# Patient Record
Sex: FEMALE | Race: BLACK OR AFRICAN AMERICAN | NOT HISPANIC OR LATINO | Employment: UNEMPLOYED | URBAN - METROPOLITAN AREA
[De-identification: names, ages, dates, MRNs, and addresses within clinical notes are randomized per-mention and may not be internally consistent; named-entity substitution may affect disease eponyms.]

---

## 2022-03-07 ENCOUNTER — TRANSFERRED RECORDS (OUTPATIENT)
Dept: HEALTH INFORMATION MANAGEMENT | Facility: CLINIC | Age: 67
End: 2022-03-07
Payer: MEDICAID

## 2022-03-07 LAB — HBA1C MFR BLD: 6.5 % (ref 4.8–5.6)

## 2022-03-18 RX ORDER — ASPIRIN 81 MG/1
81 TABLET ORAL DAILY
COMMUNITY

## 2022-03-18 RX ORDER — AMLODIPINE BESYLATE 5 MG/1
5 TABLET ORAL DAILY
COMMUNITY

## 2022-03-18 RX ORDER — ATORVASTATIN CALCIUM 80 MG/1
80 TABLET, FILM COATED ORAL DAILY
COMMUNITY

## 2022-03-18 RX ORDER — PANTOPRAZOLE SODIUM 20 MG/1
20 TABLET, DELAYED RELEASE ORAL DAILY
COMMUNITY

## 2022-03-18 NOTE — PATIENT INSTRUCTIONS
Recommendations from today's MTM visit:                                                       1. Check blood sugars a couple times weekly, fasting in the morning (goal less than 130 mg/dL).    2. Start metformin 1 tablet (500 mg) once daily with food.    3. Work on decreasing sugars and increasing meat/vegetables.    Follow-up: Return in about 3 months (around 6/22/2022) for Primary Care Provider Visit.    It was great to speak with you today.  I value your experience and would be very thankful for your time with providing feedback on our clinic survey. You may receive a survey via email or text message in the next few days.     My Clinical Pharmacist's contact information:                                                      Please feel free to contact me with any questions or concerns you have.      Kizzy Tsang, PharmD, BCACP  Medication Therapy Management Pharmacist  Pager: [phone]

## 2022-03-18 NOTE — PROGRESS NOTES
Medication Therapy Management (MTM) Encounter    ASSESSMENT:                            Medication Adherence/Access: No issues identified    Type 2 Diabetes: Patient is meeting A1c goal of < 7%. Patient may benefit from initiation of metformin. She would prefer metformin IR instead of ER due to tablet size. Also provided glucometer teaching and educated on dietary modifications today.     Hyperlipidemia: Stable.    Vitamin D Defiency: Last level showed insufficiency, recommend restarting supplementation at maintenance dosing.    Hypertension: Stable. Patient is meeting blood pressure goal of < 140/90mmHg.    GERD: Stable.    PLAN:                            1. Start metformin 1 tablet (500 mg) once daily with food.  2. Test blood sugars a couple times weekly, fasting in the morning (goal less than 130 mg/dL). Gave patient an Accu Chek Guide Me meter in clinic today. Sent orders to pharmacy for test strips and lancets.  3. Work on decreasing sugars and increasing meat/vegetables.  4. Start vitamin D 1000 units daily.    Follow-up: Return in about 3 months (around 6/22/2022) for Primary Care Provider Visit.    SUBJECTIVE/OBJECTIVE:                          Kin Abbott is a 67 year old female coming in for an initial visit. She was referred to me from  Dr. Jiang. Patient was accompanied by  Leonela.     Reason for visit: New diabetes diagnosis.    Allergies/ADRs: Reviewed in chart  Past Medical History: Reviewed in chart  Tobacco: She reports that she has never smoked. She has never used smokeless tobacco.  Alcohol: not currently using    Medication Adherence/Access: no issues reported    Type 2 Diabetes:  Currently taking no medications. Last A1c 6.5% on 3/11/22, which moved her from prediabetes into diabetes range.  Blood sugar monitoring: glucometer taught today, blood sugar was 91 mg/dL.  Symptoms of low blood sugar? none  Symptoms of high blood sugar? none  Diet/Exercise: Likes eating dates, honey,  "and crepes. Does eat meat and vegetables.  Aspirin: Taking 81mg daily and denies side effects  Statin: Yes: atorvastatin.   ACEi/ARB: No - last microalbumin <30 mg/g on 4/13/21.     Hyperlipidemia: Current therapy includes atorvastatin 80mg daily.  Patient reports no significant myalgias or other side effects.  Last fasting lipid panel on 3/7/22: , trigs 100, HDL 51, .    Vitamin D Deficiency: Previously took 50,000 units weekly but not currently on supplementation.   Last vitamin D level was 24.9 ng/mL on 3/7/22.    Hypertension: Current medications include amlodipine 5mg daily.  Patient does self-monitor blood pressure. Home BP monitoring in range of 130's systolic over 80's diastolic.  Patient reports no current medication side effects.  Last CMP on 3/7/22: fasting glucose 107 mg/dL, sCr 0.64 mg/dL, eGFR 97 mL/min, and electrolytes WNL.    GERD: Current medications include: Protonix (pantoprazole) 20mg  once daily. Patient reports no current symptoms.  Patient feels that current regimen is effective.    Today's Vitals: /76   Pulse 61   Ht 5' 4.5\" (1.638 m)   Wt 226 lb 5.8 oz (102.7 kg)   BMI 38.25 kg/m       ----------------    I spent 45 minutes with this patient today. All changes were made via collaborative practice agreement with Dr. Jiang. A copy of the visit note was provided to the patient's provider(s).    The patient was given a summary of these recommendations.     Margarita Morel, Pharm.D, [facility]  Medication Therapy Management Pharmacist  [phone]       Medication Therapy Recommendations  Type 2 diabetes mellitus without complication, without long-term current use of insulin (H)    Current Medication: blood glucose (ACCU-CHEK GUIDE) test strip   Rationale: Medication requires monitoring - Needs additional monitoring - Effectiveness   Recommendation: Self-Monitoring - Accu-Chek Guide Strp - Test blood sugars once daily or as directed.   Status: Accepted per CPA          " Current Medication: metFORMIN (GLUCOPHAGE) 500 MG tablet   Rationale: Untreated condition - Needs additional medication therapy - Indication   Recommendation: Start Medication - metFORMIN 500 MG tablet - Take 1 tablet by mouth daily.   Status: Accepted per CPA         Vitamin D deficiency    Current Medication: Vitamin D, Cholecalciferol, 25 MCG (1000 UT) CAPS   Rationale: Untreated condition - Needs additional medication therapy - Indication   Recommendation: Start Medication - Vitamin D (Cholecalciferol) 25 MCG (1000 UT) Caps - Take 1 capsule by mouth daily.   Status: Accepted per CPA

## 2022-03-22 ENCOUNTER — OFFICE VISIT (OUTPATIENT)
Dept: PHARMACY | Facility: PHYSICIAN GROUP | Age: 67
End: 2022-03-22
Payer: COMMERCIAL

## 2022-03-22 VITALS
HEIGHT: 65 IN | HEART RATE: 61 BPM | WEIGHT: 226.36 LBS | BODY MASS INDEX: 37.72 KG/M2 | SYSTOLIC BLOOD PRESSURE: 134 MMHG | DIASTOLIC BLOOD PRESSURE: 76 MMHG

## 2022-03-22 DIAGNOSIS — E78.5 DYSLIPIDEMIA: ICD-10-CM

## 2022-03-22 DIAGNOSIS — I10 BENIGN ESSENTIAL HYPERTENSION: ICD-10-CM

## 2022-03-22 DIAGNOSIS — E55.9 VITAMIN D DEFICIENCY: ICD-10-CM

## 2022-03-22 DIAGNOSIS — E11.9 TYPE 2 DIABETES MELLITUS WITHOUT COMPLICATION, WITHOUT LONG-TERM CURRENT USE OF INSULIN (H): Primary | ICD-10-CM

## 2022-03-22 DIAGNOSIS — K21.9 GASTROESOPHAGEAL REFLUX DISEASE, UNSPECIFIED WHETHER ESOPHAGITIS PRESENT: ICD-10-CM

## 2022-03-22 PROCEDURE — 99605 MTMS BY PHARM NP 15 MIN: CPT | Performed by: PHARMACIST

## 2022-03-22 PROCEDURE — 99607 MTMS BY PHARM ADDL 15 MIN: CPT | Performed by: PHARMACIST

## 2022-03-24 RX ORDER — BLOOD SUGAR DIAGNOSTIC
STRIP MISCELLANEOUS
COMMUNITY

## 2022-03-24 RX ORDER — FAMOTIDINE 20 MG
1 TABLET ORAL DAILY
COMMUNITY

## 2022-06-20 ENCOUNTER — TRANSFERRED RECORDS (OUTPATIENT)
Dept: HEALTH INFORMATION MANAGEMENT | Facility: CLINIC | Age: 67
End: 2022-06-20